# Patient Record
Sex: FEMALE | Race: ASIAN
[De-identification: names, ages, dates, MRNs, and addresses within clinical notes are randomized per-mention and may not be internally consistent; named-entity substitution may affect disease eponyms.]

---

## 2020-12-17 LAB
BASOPHILS NFR BLD: 0.4 % (ref 0.2–1.3)
BUN SERPL-MCNC: 21 MG/DL (ref 7–18)
CALCIUM SERPL-MCNC: 8.8 MG/DL (ref 8.5–10.1)
CHLORIDE SERPL-SCNC: 101 MMOL/L (ref 98–107)
CO2 SERPL-SCNC: 29.4 MMOL/L (ref 21–32)
CREAT SERPL-MCNC: 0.9 MG/DL (ref 0.6–1)
ERYTHROCYTE [DISTWIDTH] IN BLOOD BY AUTOMATED COUNT: 13.9 % (ref 12.3–17.7)
GFR SERPLBLD BASED ON 1.73 SQ M-ARVRAT: > 60 ML/MIN
GLUCOSE SERPL-MCNC: 127 MG/DL (ref 74–106)
MICROSCOPIC UR-IMP: YES
PLATELET # BLD: 204 X10^3MCL (ref 179–408)
POTASSIUM SERPL-SCNC: 3.3 MMOL/L (ref 3.5–5.1)
RBC # UR STRIP.AUTO: (no result) /UL
SODIUM SERPL-SCNC: 141 MMOL/L (ref 136–145)
UA SPECIFIC GRAVITY: 1.02 (ref 1–1.03)

## 2020-12-17 NOTE — NUR
LAB TEST FAXED TO DR. DARRYN IGLESIAS'S OFFICE FOR REVIEW. EKG, CXR, LAB TESTS WITH
MEDICAL CLEARANCE SENT TO [facility] FOR REVIEW.

## 2020-12-17 NOTE — NUR
LAB TESTS AND EKG SENT TO [facility] FOR REVIEW. [facility] FOR SURGERY 12-21-20 PER
DR. SHOAIB HERRERA.

## 2020-12-21 ENCOUNTER — HOSPITAL ENCOUNTER (OUTPATIENT)
Dept: HOSPITAL 1 - DS | Age: 65
Discharge: HOME | End: 2020-12-21
Attending: UROLOGY
Payer: COMMERCIAL

## 2020-12-21 VITALS — WEIGHT: 134.99 LBS | HEIGHT: 62 IN | BODY MASS INDEX: 24.84 KG/M2

## 2020-12-21 VITALS — SYSTOLIC BLOOD PRESSURE: 109 MMHG | DIASTOLIC BLOOD PRESSURE: 64 MMHG

## 2020-12-21 VITALS — SYSTOLIC BLOOD PRESSURE: 108 MMHG | DIASTOLIC BLOOD PRESSURE: 59 MMHG

## 2020-12-21 DIAGNOSIS — Z98.890: ICD-10-CM

## 2020-12-21 DIAGNOSIS — Z98.891: ICD-10-CM

## 2020-12-21 DIAGNOSIS — Z20.828: ICD-10-CM

## 2020-12-21 DIAGNOSIS — D64.9: ICD-10-CM

## 2020-12-21 DIAGNOSIS — E11.9: ICD-10-CM

## 2020-12-21 DIAGNOSIS — Z79.899: ICD-10-CM

## 2020-12-21 DIAGNOSIS — I10: ICD-10-CM

## 2020-12-21 DIAGNOSIS — E78.00: ICD-10-CM

## 2020-12-21 DIAGNOSIS — Z90.710: ICD-10-CM

## 2020-12-21 DIAGNOSIS — E78.5: ICD-10-CM

## 2020-12-21 DIAGNOSIS — Z79.84: ICD-10-CM

## 2020-12-21 DIAGNOSIS — N13.2: Primary | ICD-10-CM

## 2020-12-21 PROCEDURE — U0003 INFECTIOUS AGENT DETECTION BY NUCLEIC ACID (DNA OR RNA); SEVERE ACUTE RESPIRATORY SYNDROME CORONAVIRUS 2 (SARS-COV-2) (CORONAVIRUS DISEASE [COVID-19]), AMPLIFIED PROBE TECHNIQUE, MAKING USE OF HIGH THROUGHPUT TECHNOLOGIES AS DESCRIBED BY CMS-2020-01-R: HCPCS

## 2020-12-21 PROCEDURE — C2625 STENT, NON-COR, TEM W/DEL SY: HCPCS

## 2020-12-21 PROCEDURE — C1769 GUIDE WIRE: HCPCS

## 2020-12-31 ENCOUNTER — HOSPITAL ENCOUNTER (INPATIENT)
Dept: HOSPITAL 1 - ED | Age: 65
LOS: 2 days | Discharge: HOME | DRG: 720 | End: 2021-01-02
Attending: HOSPITALIST | Admitting: HOSPITALIST
Payer: COMMERCIAL

## 2020-12-31 VITALS
BODY MASS INDEX: 25.03 KG/M2 | WEIGHT: 136 LBS | HEIGHT: 62 IN | WEIGHT: 136 LBS | HEIGHT: 62 IN | BODY MASS INDEX: 25.03 KG/M2

## 2020-12-31 DIAGNOSIS — E78.5: ICD-10-CM

## 2020-12-31 DIAGNOSIS — A41.9: Primary | ICD-10-CM

## 2020-12-31 DIAGNOSIS — N39.0: ICD-10-CM

## 2020-12-31 DIAGNOSIS — I10: ICD-10-CM

## 2020-12-31 DIAGNOSIS — Z79.84: ICD-10-CM

## 2020-12-31 DIAGNOSIS — Z20.828: ICD-10-CM

## 2020-12-31 DIAGNOSIS — N17.0: ICD-10-CM

## 2020-12-31 DIAGNOSIS — B96.5: ICD-10-CM

## 2020-12-31 DIAGNOSIS — R65.20: ICD-10-CM

## 2020-12-31 DIAGNOSIS — E87.6: ICD-10-CM

## 2020-12-31 DIAGNOSIS — E87.1: ICD-10-CM

## 2020-12-31 DIAGNOSIS — E44.0: ICD-10-CM

## 2020-12-31 DIAGNOSIS — E11.9: ICD-10-CM

## 2020-12-31 DIAGNOSIS — N12: ICD-10-CM

## 2020-12-31 LAB
ALBUMIN SERPL-MCNC: 2.9 G/DL (ref 3.4–5)
ALP SERPL-CCNC: 67 U/L (ref 46–116)
ALT SERPL-CCNC: 38 U/L (ref 14–59)
AST SERPL-CCNC: 25 U/L (ref 15–37)
BASOPHILS NFR BLD: 0.4 % (ref 0.2–1.3)
BILIRUB SERPL-MCNC: 1.03 MG/DL (ref 0.2–1)
BUN SERPL-MCNC: 34 MG/DL (ref 7–18)
CALCIUM SERPL-MCNC: 9 MG/DL (ref 8.5–10.1)
CHLORIDE SERPL-SCNC: 96 MMOL/L (ref 98–107)
CO2 SERPL-SCNC: 27.4 MMOL/L (ref 21–32)
CREAT SERPL-MCNC: 1.9 MG/DL (ref 0.6–1)
ERYTHROCYTE [DISTWIDTH] IN BLOOD BY AUTOMATED COUNT: 14.2 % (ref 12.3–17.7)
GFR SERPLBLD BASED ON 1.73 SQ M-ARVRAT: 28 ML/MIN
GLUCOSE SERPL-MCNC: 169 MG/DL (ref 74–106)
MICROSCOPIC UR-IMP: YES
PLATELET # BLD: 359 X10^3MCL (ref 179–408)
POTASSIUM SERPL-SCNC: 2.9 MMOL/L (ref 3.5–5.1)
PROT SERPL-MCNC: 8.9 G/DL (ref 6.4–8.2)
RBC # UR STRIP.AUTO: (no result) /UL
SODIUM SERPL-SCNC: 135 MMOL/L (ref 136–145)
UA SPECIFIC GRAVITY: 1.02 (ref 1–1.03)

## 2020-12-31 PROCEDURE — G0378 HOSPITAL OBSERVATION PER HR: HCPCS

## 2021-01-01 VITALS — SYSTOLIC BLOOD PRESSURE: 140 MMHG | DIASTOLIC BLOOD PRESSURE: 71 MMHG

## 2021-01-01 LAB
AMPHETAMINES UR QL SCN: (no result)
AMYLASE SERPL-CCNC: 69 U/L (ref 25–115)
BASOPHILS NFR BLD: 1.3 % (ref 0.2–1.3)
BUN SERPL-MCNC: 32 MG/DL (ref 7–18)
CALCIUM SERPL-MCNC: 8.4 MG/DL (ref 8.5–10.1)
CHLORIDE SERPL-SCNC: 101 MMOL/L (ref 98–107)
CHOLEST SERPL-MCNC: 248 MG/DL (ref ?–200)
CHOLEST/HDLC SERPL: 6.9 MG/DL
CO2 SERPL-SCNC: 21.9 MMOL/L (ref 21–32)
CREAT SERPL-MCNC: 1.4 MG/DL (ref 0.6–1)
ERYTHROCYTE [DISTWIDTH] IN BLOOD BY AUTOMATED COUNT: 13.9 % (ref 12.3–17.7)
GFR SERPLBLD BASED ON 1.73 SQ M-ARVRAT: 40 ML/MIN
GLUCOSE SERPL-MCNC: 137 MG/DL (ref 74–106)
HDLC SERPL-MCNC: 36 MG/DL (ref 40–60)
LIPASE SERPL-CCNC: 273 IU/L (ref 73–393)
MAGNESIUM SERPL-MCNC: 2 MG/DL (ref 1.8–2.4)
PHOSPHATE SERPL-MCNC: 3.6 MG/DL (ref 2.5–4.9)
PLATELET # BLD: 438 X10^3MCL (ref 179–408)
POTASSIUM SERPL-SCNC: 3.4 MMOL/L (ref 3.5–5.1)
SODIUM SERPL-SCNC: 136 MMOL/L (ref 136–145)
T3 SERPL-MCNC: 0.62 NG/ML
T3RU NFR SERPL: 39 % UPTAKE (ref 30–39)
T4 FREE SERPL-MCNC: 1.49 NG/DL (ref 0.76–1.46)
T4 SERPL-MCNC: 7.3 UG/DL (ref 4.7–13.3)
T4/T3 UPTAKE INDEX SERPL: 2.8 UG/DL (ref 1.4–4.5)
TRIGL SERPL-MCNC: 241 MG/DL (ref ?–150)

## 2021-01-02 VITALS — SYSTOLIC BLOOD PRESSURE: 137 MMHG | DIASTOLIC BLOOD PRESSURE: 74 MMHG

## 2021-01-02 VITALS — DIASTOLIC BLOOD PRESSURE: 600 MMHG | SYSTOLIC BLOOD PRESSURE: 105 MMHG

## 2021-01-02 VITALS — DIASTOLIC BLOOD PRESSURE: 60 MMHG | SYSTOLIC BLOOD PRESSURE: 105 MMHG

## 2021-01-02 LAB
BASOPHILS NFR BLD: 0.6 % (ref 0.2–1.3)
BUN SERPL-MCNC: 14 MG/DL (ref 7–18)
CALCIUM SERPL-MCNC: 8 MG/DL (ref 8.5–10.1)
CHLORIDE SERPL-SCNC: 105 MMOL/L (ref 98–107)
CO2 SERPL-SCNC: 22.3 MMOL/L (ref 21–32)
CREAT SERPL-MCNC: 1 MG/DL (ref 0.6–1)
ERYTHROCYTE [DISTWIDTH] IN BLOOD BY AUTOMATED COUNT: 13.8 % (ref 12.3–17.7)
GFR SERPLBLD BASED ON 1.73 SQ M-ARVRAT: 60 ML/MIN
GLUCOSE SERPL-MCNC: 124 MG/DL (ref 74–106)
MAGNESIUM SERPL-MCNC: 1.5 MG/DL (ref 1.8–2.4)
PHOSPHATE SERPL-MCNC: 2 MG/DL (ref 2.5–4.9)
PLATELET # BLD: 402 X10^3MCL (ref 179–408)
POTASSIUM SERPL-SCNC: 3.5 MMOL/L (ref 3.5–5.1)
RBC MORPH BLD: NORMAL
SODIUM SERPL-SCNC: 139 MMOL/L (ref 136–145)